# Patient Record
Sex: MALE | Race: WHITE | NOT HISPANIC OR LATINO | URBAN - METROPOLITAN AREA
[De-identification: names, ages, dates, MRNs, and addresses within clinical notes are randomized per-mention and may not be internally consistent; named-entity substitution may affect disease eponyms.]

---

## 2018-04-10 ENCOUNTER — OFFICE VISIT (OUTPATIENT)
Dept: ORTHOPEDIC SURGERY | Facility: CLINIC | Age: 48
End: 2018-04-10

## 2018-04-10 VITALS — WEIGHT: 195 LBS | HEIGHT: 73 IN | BODY MASS INDEX: 25.84 KG/M2 | TEMPERATURE: 98 F

## 2018-04-10 DIAGNOSIS — M47.22 CERVICAL SPONDYLOSIS WITH RADICULOPATHY: ICD-10-CM

## 2018-04-10 DIAGNOSIS — M54.2 CERVICAL SPINE PAIN: Primary | ICD-10-CM

## 2018-04-10 PROCEDURE — 99204 OFFICE O/P NEW MOD 45 MIN: CPT | Performed by: ORTHOPAEDIC SURGERY

## 2018-04-10 PROCEDURE — 72040 X-RAY EXAM NECK SPINE 2-3 VW: CPT | Performed by: ORTHOPAEDIC SURGERY

## 2018-04-10 RX ORDER — METHYLPREDNISOLONE 4 MG/1
TABLET ORAL
Qty: 21 TABLET | Refills: 0 | Status: SHIPPED | OUTPATIENT
Start: 2018-04-10

## 2018-04-10 RX ORDER — ASPIRIN 81 MG/1
81 TABLET ORAL
Refills: 3 | COMMUNITY
Start: 2018-02-28

## 2018-04-10 RX ORDER — SIMVASTATIN 20 MG
20 TABLET ORAL
Refills: 3 | COMMUNITY
Start: 2018-02-28

## 2018-04-10 RX ORDER — ISOSORBIDE MONONITRATE 30 MG/1
30 TABLET, EXTENDED RELEASE ORAL
Refills: 3 | COMMUNITY
Start: 2018-02-28

## 2018-04-10 RX ORDER — AMLODIPINE BESYLATE 5 MG/1
5 TABLET ORAL
Refills: 3 | COMMUNITY
Start: 2018-02-28

## 2018-04-10 RX ORDER — LISINOPRIL AND HYDROCHLOROTHIAZIDE 12.5; 1 MG/1; MG/1
TABLET ORAL
Refills: 3 | COMMUNITY
Start: 2018-02-28

## 2018-04-10 NOTE — PROGRESS NOTES
New patient or new problem visit    Chief Complaint   Patient presents with   • Cervical Spine - Establish Care, Pain       HPI: She complains of pain in the left pectoral region radiating to the left shoulder and neck to the left shoulder and some neck pain itself since October of last year in the last 2 weeks been remarkably better the pain severe intermittent stabbing worse with activity.  He has managed to work despite the pain.    PFSH: See chart- reviewed.  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  He smokes 1-1/2 packs of cigarettes per day.    Review of Systems   HENT: Positive for postnasal drip.    Eyes: Positive for visual disturbance.   Musculoskeletal: Positive for neck pain and neck stiffness.       PE: Constitutional: Vital signs above-noted.  Awake, alert and oriented    Psychiatric: Affect and insight do not appear grossly disturbed.    Pulmonary: Breathing is unlabored, color is good.    Skin: Warm, dry and normal turgor    Cardiac:  radial pulses intact.  No arm edema.    Eyesight and hearing appear adequate for examination purposes    Musculoskeletal:  Posture is unremarkable to coronal and sagittal inspection.  Motion appears undisturbed.  The skin about the area is intact.  There is no palpable or visible deformity.  There is no local spasm. There is mild tenderness to percussion and palpation of the cervical spine.     Neurologic:  In the bilateral upper extremities there is no evidence of atrophy.  Motor function is undisturbed in shoulder abduction, elbow flexion, wrist extension, finger extension, triceps extension, or finger abduction   .  Sensation appears symmetrically intact to light touch   .  Reflexes are 2+ and symmetrical in the biceps, brachioradialis, and triceps. Saunders test is negative.  Gait appears undisturbed.  Spurling test is negative.      MEDICAL DECISION MAKING    XRAY: Plain film x-ray the cervical spine demonstrates a metallic object in the right trapezial area on the AP view but I  "don't see it on the lateral view.  It shows up in the right the trapezial area and anterior cervical triangle was metal artifact interference on MRI scanning.  MRI C-spine also demonstrates C4 5 foraminal narrowing bilaterally and some degenerative changes at C5 6.  I reviewed the radiologist's report with which I agree and I have a second hand report of a \"normal shoulder MRI but I don't have the official reading      Other: n/a    Impression: Cervical spondylosis with radiculopathy and left shoulder pain both of which are improving.    Plan: We'll try a Medrol Dosepak and see if this doesn't run its course otherwise we'll need to figure out if it's neck shoulder or both.  Certainly be a reasonable candidate for cervical epidurals and/or surgery.    "